# Patient Record
Sex: MALE | Race: BLACK OR AFRICAN AMERICAN
[De-identification: names, ages, dates, MRNs, and addresses within clinical notes are randomized per-mention and may not be internally consistent; named-entity substitution may affect disease eponyms.]

---

## 2021-05-09 ENCOUNTER — HOSPITAL ENCOUNTER (EMERGENCY)
Dept: HOSPITAL 61 - ER | Age: 86
Discharge: HOME | End: 2021-05-09
Payer: OTHER GOVERNMENT

## 2021-05-09 VITALS — WEIGHT: 168.43 LBS | BODY MASS INDEX: 23.58 KG/M2 | HEIGHT: 71 IN

## 2021-05-09 VITALS — SYSTOLIC BLOOD PRESSURE: 142 MMHG | DIASTOLIC BLOOD PRESSURE: 80 MMHG

## 2021-05-09 DIAGNOSIS — K52.9: Primary | ICD-10-CM

## 2021-05-09 DIAGNOSIS — I10: ICD-10-CM

## 2021-05-09 LAB
% BANDS: 3 % (ref 0–9)
% LYMPHS: 6 % (ref 24–48)
% MONOS: 4 % (ref 0–10)
% SEGS: 87 % (ref 35–66)
ALBUMIN SERPL-MCNC: 4.1 G/DL (ref 3.4–5)
ALBUMIN/GLOB SERPL: 1.1 {RATIO} (ref 1–1.7)
ALP SERPL-CCNC: 92 U/L (ref 46–116)
ALT SERPL-CCNC: 20 U/L (ref 16–63)
ANION GAP SERPL CALC-SCNC: 8 MMOL/L (ref 6–14)
AST SERPL-CCNC: 19 U/L (ref 15–37)
BASOPHILS # BLD AUTO: 0 X10^3/UL (ref 0–0.2)
BASOPHILS NFR BLD: 0 % (ref 0–3)
BILIRUB SERPL-MCNC: 0.6 MG/DL (ref 0.2–1)
BUN SERPL-MCNC: 19 MG/DL (ref 8–26)
BUN/CREAT SERPL: 17 (ref 6–20)
CALCIUM SERPL-MCNC: 8.4 MG/DL (ref 8.5–10.1)
CHLORIDE SERPL-SCNC: 107 MMOL/L (ref 98–107)
CO2 SERPL-SCNC: 28 MMOL/L (ref 21–32)
CREAT SERPL-MCNC: 1.1 MG/DL (ref 0.7–1.3)
EOSINOPHIL NFR BLD: 0 % (ref 0–3)
EOSINOPHIL NFR BLD: 0 X10^3/UL (ref 0–0.7)
ERYTHROCYTE [DISTWIDTH] IN BLOOD BY AUTOMATED COUNT: 14.1 % (ref 11.5–14.5)
GFR SERPLBLD BASED ON 1.73 SQ M-ARVRAT: 63.8 ML/MIN
GLUCOSE SERPL-MCNC: 92 MG/DL (ref 70–99)
HCT VFR BLD CALC: 48 % (ref 39–53)
HGB BLD-MCNC: 16.2 G/DL (ref 13–17.5)
LIPASE: 53 U/L (ref 73–393)
LYMPHOCYTES # BLD: 0.3 X10^3/UL (ref 1–4.8)
LYMPHOCYTES NFR BLD AUTO: 3 % (ref 24–48)
MAGNESIUM SERPL-MCNC: 2.3 MG/DL (ref 1.8–2.4)
MCH RBC QN AUTO: 32 PG (ref 25–35)
MCHC RBC AUTO-ENTMCNC: 34 G/DL (ref 31–37)
MCV RBC AUTO: 93 FL (ref 79–100)
MONO #: 0.5 X10^3/UL (ref 0–1.1)
MONOCYTES NFR BLD: 5 % (ref 0–9)
NEUT #: 9.3 X10^3/UL (ref 1.8–7.7)
NEUTROPHILS NFR BLD AUTO: 92 % (ref 31–73)
PLATELET # BLD AUTO: 230 X10^3/UL (ref 140–400)
PLATELET # BLD EST: ADEQUATE 10*3/UL
POTASSIUM SERPL-SCNC: 4.4 MMOL/L (ref 3.5–5.1)
PROT SERPL-MCNC: 8 G/DL (ref 6.4–8.2)
RBC # BLD AUTO: 5.14 X10^6/UL (ref 4.3–5.7)
SODIUM SERPL-SCNC: 143 MMOL/L (ref 136–145)
WBC # BLD AUTO: 10.1 X10^3/UL (ref 4–11)

## 2021-05-09 PROCEDURE — 83735 ASSAY OF MAGNESIUM: CPT

## 2021-05-09 PROCEDURE — 99285 EMERGENCY DEPT VISIT HI MDM: CPT

## 2021-05-09 PROCEDURE — 84484 ASSAY OF TROPONIN QUANT: CPT

## 2021-05-09 PROCEDURE — 96374 THER/PROPH/DIAG INJ IV PUSH: CPT

## 2021-05-09 PROCEDURE — 74022 RADEX COMPL AQT ABD SERIES: CPT

## 2021-05-09 PROCEDURE — 74177 CT ABD & PELVIS W/CONTRAST: CPT

## 2021-05-09 PROCEDURE — 96361 HYDRATE IV INFUSION ADD-ON: CPT

## 2021-05-09 PROCEDURE — 85007 BL SMEAR W/DIFF WBC COUNT: CPT

## 2021-05-09 PROCEDURE — 80053 COMPREHEN METABOLIC PANEL: CPT

## 2021-05-09 PROCEDURE — 85025 COMPLETE CBC W/AUTO DIFF WBC: CPT

## 2021-05-09 PROCEDURE — 93005 ELECTROCARDIOGRAM TRACING: CPT

## 2021-05-09 PROCEDURE — 83690 ASSAY OF LIPASE: CPT

## 2021-05-09 PROCEDURE — 36415 COLL VENOUS BLD VENIPUNCTURE: CPT

## 2021-05-09 NOTE — EKG
[facility]

              [address] [place] [address]

Test Date:    2021               Test Time:    15:20:42

Pat Name:     LEONA LONDON          Department:   

Patient ID:   [id number]           Room:          

Gender:       M                        Technician:   

:          1935               Requested By: BONIFACIO NEVES

Order Number: [id number]           Reading MD:     

                                 Measurements

Intervals                              Axis          

Rate:         80                       P:            55

MI:           204                      QRS:          10

QRSD:         82                       T:            42

QT:           350                                    

QTc:          407                                    

                           Interpretive Statements

SINUS RHYTHM

LEFT ATRIAL ABNORMALITY

ABNORMAL ECG

RI6.01

No previous ECG available for comparison

## 2021-05-09 NOTE — RAD
EXAM: Abdomen series.



HISTORY: Nausea and vomiting.



COMPARISON: None.



FINDINGS: A frontal view of the chest and frontal upright and supine views of the abdomen are obtaine
d. There is no infiltrate, pleural effusion or pneumothorax. The heart is normal in size. There are p
rominent air-filled loops of bowel within the midabdomen. There is no transition point to suggest obs
truction. There is no free air. There are degenerative changes throughout the spine.



IMPRESSION: 

1. No acute pulmonary finding.

2. Nonspecific air-filled loops of bowel throughout the abdomen. There is no convincing obstruction.



Electronically signed by: Mari Hightower MD (5/9/2021 4:51 PM) [facility]

## 2021-05-09 NOTE — RAD
EXAM: Abdomen and pelvis CT with intravenous contrast.



HISTORY: Nausea. Vomiting and pain.



TECHNIQUE: Computed tomographic images of the abdomen and pelvis were obtained following the administ
ration of intravenous contrast. Multiplanar reformatting was performed.



*One or more of the following individualized dose reduction techniques were utilized for this examina
tion:  

1. Automated exposure control.  

2. Adjustment of the mA and/or kV according to patient size.  

3. Use of iterative reconstruction technique.



COMPARISON: None.



FINDINGS: Evaluation of the lower thorax demonstrates bilateral posterior dependent and basilar atele
ctasis. The possibility of superimposed lower lobe interstitial infiltrate is not excluded. There is 
no pleural effusion. There is a 1.5 cm hyperdense or enhancing lesion within the lateral right hepati
c lobe, the appearance of which favors benign shunt phenomenon. There is no suspicious hepatic lesion
. The gallbladder, pancreas, spleen, stomach and adrenal glands are unremarkable. There are multiple 
simple appearing renal cysts, the largest of which is seen on the right measuring 10.0 cm. There is l
eft renal cortical scarring. There is no hydronephrosis.



The cecum is positioned within the right midabdomen. The appendix is not seen. There are prominent fl
uid-filled loops of small bowel with slight wall thickening throughout the mid and lower abdomen, sug
gesting enteritis. There is no bowel structure in. There is no free air. The urinary bladder is unrem
arkable. The aorta is normal in caliber. There is no lymphadenopathy. There are degenerative changes 
throughout the spine. There is lumbar hyperlordosis and multilevel listhesis. There is no acute or dunbar
spicious osseous finding.



IMPRESSION: 

1. Prominent fluid-filled loops of small bowel within the mid and lower lobe suggesting enteritis.. T
here is no transition point to suggest obstruction.

2. Nonvisualization of the appendix. The cecum is positioned within the right midabdomen, a normal va
riant. There are no secondary findings to suggest appendicitis.

3. Multiple simple appearing renal cysts, the largest of which is 10.0 cm on the right. There is alis
l cortical scarring.

4. Bilateral lower lobe atelectasis. The possibility of superimposed interstitial infiltrate is not e
xcluded.



Electronically signed by: Mari Hightower MD (5/9/2021 6:04 PM) [facility]

## 2021-05-09 NOTE — PHYS DOC
Past Medical History


Past Medical History:  Hypertension, Other


Additional Past Medical Histor:  R kidney cst, hernia, hemorrhoids


 (BONIFACIO NEVES DO)


Past Surgical History:  Other


Additional Past Surgical Histo:  hernia and hemorrhoids


 (BONIFACIO NEVES DO)


Smoking Status:  Never Smoker


Alcohol Use:  None


 (BONIFACIO NEVES DO)





General Adult


EDM:


Chief Complaint:  NAUSEA/VOMITING/DIARRHEA





HPI:


HPI:





Patient is a 86  year old male who was brought here by EMS due to nausea and 

vomiting with diarrhea.  Patient said he went out , ate some Chinese food 

yesterday evening, this morning he has diarrhea at home.  Patient ate some 

crackers and drink some water this morning and took his family to [employer].  

Patient then went to the nursing home to visit his sister.  While he was stand

ing there, patient started feeling queasy in his stomach, he went to his car and

sat down.  Patient started feeling very nauseous and vomited once.  He did have 

another episode of diarrhea earlier.  Patient has some cramping pain in his 

stomach area.  Patient denies any chest pain, no trouble breathing, no cough.  

Patient did not eat lunch, he was planning to go to have lunch after he visits 

his sister at the nursing home.  Patient felt much better now after he vomited. 


 (BONIFACIO NEVES DO)





Review of Systems:


Review of Systems:


Constitutional:   Denies fever or chills. []


Eyes:   Denies change in visual acuity. []


HENT:   Denies nasal congestion or sore throat. [] 


Respiratory:   Denies cough or shortness of breath. [] 


Cardiovascular:   Denies chest pain or edema. [] 


GI:   Positive for abdominal pain, nausea, vomiting, diarrhea. [] 


:  Denies dysuria. [] 


Musculoskeletal:   Denies back pain or joint pain. [] 


Integument:   Denies rash. [] 


Neurologic:   Denies headache, focal weakness or sensory changes. [] 


Endocrine:   Denies polyuria or polydipsia. [] 


Lymphatic:  Denies swollen glands. [] 


Psychiatric:  Denies depression or anxiety. []


 (BONIFACIO NEVES DO)





Heart Score:


C/O Chest Pain:  N/A


Risk Factors:


Risk Factors:  DM, Current or recent (<one month) smoker, HTN, HLP, family 

history of CAD, obesity.


Risk Scores:


Score 0 - 3:  2.5% MACE over next 6 weeks - Discharge Home


Score 4 - 6:  20.3% MACE over next 6 weeks - Admit for Clinical Observation


Score 7 - 10:  72.7% MACE over next 6 weeks - Early Invasive Strategies


 (BONIFACIO NEVES DO)





Current Medications:





Current Medications








 Medications


  (Trade)  Dose


 Ordered  Sig/Larisa  Start Time


 Stop Time Status Last Admin


Dose Admin


 


 Ondansetron HCl


  (Zofran)  4 mg  1X  ONCE  21 15:00


 21 15:01 UNV  





 


 Sodium Chloride  500 ml @ 


 500 mls/hr  1X  ONCE  21 15:00


 21 15:59 UNV  











 (BONIFACIO NEVES DO)





Physical Exam:


PE:





Constitutional: Well developed, well nourished, no acute distress, non-toxic 

appearance. []


HENT: Normocephalic, atraumatic, bilateral external ears normal, oropharynx 

moist, no oral exudates, nose normal. []


Eyes: PERRLA, EOMI, conjunctiva normal, no discharge. [] 


Neck: Normal range of motion, no tenderness, supple, no stridor. [] 


Cardiovascular:Heart rate regular rhythm, no murmur []


Lungs & Thorax:  Bilateral breath sounds clear to auscultation []


Abdomen: Bowel sounds normal, soft, no tenderness, no masses, no pulsatile 

masses. [] 


Skin: Warm, dry, no erythema, no rash. [] 


Back: No tenderness, no CVA tenderness. [] 


Extremities: No tenderness, no cyanosis, no clubbing, ROM intact, no edema. [] 


Neurologic: Alert and oriented X 3, normal motor function, normal sensory 

function, no focal deficits noted. []


Psychologic: Affect normal, judgement normal, mood normal. []


 (BONIFACIO NEVES DO)


PE:





Constitutional: Well developed, well nourished, no acute distress, non-toxic 

appearance


HENT: Normocephalic, atraumatic


Eyes: Conjunctiva normal, no discharge


Neck: Normal range of motion, supple


Lungs & Thorax:  No respiratory distress, equal chest rise and fall


Abdomen: Soft, no tenderness, no guarding/rebound tenderness/distention


Skin: Warm, dry, no erythema, no rash


Extremities: No tenderness, ROM intact, no edema


Neurologic: Alert and oriented X 3, no focal deficits noted


Psychologic: Affect normal, judgment normal


 (CASTILLO,FRIDA R DO)


Current Patient Data:


Labs:





Laboratory Tests








Test


 21


15:10


 


White Blood Count 10.1 x10^3/uL 


 


Red Blood Count 5.14 x10^6/uL 


 


Hemoglobin 16.2 g/dL 


 


Hematocrit 48.0 % 


 


Mean Corpuscular Volume 93 fL 


 


Mean Corpuscular Hemoglobin 32 pg 


 


Mean Corpuscular Hemoglobin


Concent 34 g/dL 





 


Red Cell Distribution Width 14.1 % 


 


Platelet Count 230 x10^3/uL 


 


Neutrophils (%) (Auto) 92 % 


 


Lymphocytes (%) (Auto) 3 % 


 


Monocytes (%) (Auto) 5 % 


 


Eosinophils (%) (Auto) 0 % 


 


Basophils (%) (Auto) 0 % 


 


Neutrophils # (Auto) 9.3 x10^3/uL 


 


Lymphocytes # (Auto) 0.3 x10^3/uL 


 


Monocytes # (Auto) 0.5 x10^3/uL 


 


Eosinophils # (Auto) 0.0 x10^3/uL 


 


Basophils # (Auto) 0.0 x10^3/uL 


 


Segmented Neutrophils % 87 % 


 


Band Neutrophils % 3 % 


 


Lymphocytes % 6 % 


 


Monocytes % 4 % 


 


Platelet Estimate Adequate 


 


Sodium Level 143 mmol/L 


 


Potassium Level 4.4 mmol/L 


 


Chloride Level 107 mmol/L 


 


Carbon Dioxide Level 28 mmol/L 


 


Anion Gap 8 


 


Blood Urea Nitrogen 19 mg/dL 


 


Creatinine 1.1 mg/dL 


 


Estimated GFR


(Cockcroft-Gault) 63.8 





 


BUN/Creatinine Ratio 17 


 


Glucose Level 92 mg/dL 


 


Calcium Level 8.4 mg/dL 


 


Magnesium Level 2.3 mg/dL 


 


Total Bilirubin 0.6 mg/dL 


 


Aspartate Amino Transf


(AST/SGOT) 19 U/L 





 


Alanine Aminotransferase


(ALT/SGPT) 20 U/L 





 


Alkaline Phosphatase 92 U/L 


 


Troponin I Quantitative < 0.017 ng/mL 


 


Total Protein 8.0 g/dL 


 


Albumin 4.1 g/dL 


 


Albumin/Globulin Ratio 1.1 


 


Lipase 53 U/L 








Current Medications








 Medications


  (Trade)  Dose


 Ordered  Sig/Larisa


 Route


 PRN Reason  Start Time


 Stop Time Status Last Admin


Dose Admin


 


 Ondansetron HCl


  (Zofran)  4 mg  1X  ONCE


 IVP


   21 15:00


 21 15:39 DC 21 15:37





 


 Sodium Chloride  500 ml @ 


 500 mls/hr  1X  ONCE


 IV


   21 15:00


 5/9/21 15:59 DC 21 15:39





 


 Iohexol


  (Omnipaque 300


 Mg/ml)  75 ml  1X  ONCE


 IV


   21 17:30


 21 17:31   





 


 Info


  (CONTRAST GIVEN


 -- Rx MONITORING)  1 each  PRN DAILY  PRN


 [facility]


 SEE COMMENTS  21 17:15


 21 17:14   











Vital Signs:





                                   Vital Signs








  Date Time  Temp Pulse Resp B/P (MAP) Pulse Ox O2 Delivery O2 Flow Rate FiO2


 


21 14:45 98.5 83 20 142/80 (100) 96 Room Air  





 98.5       








 (BONIFACIO NEVES DO)





EKG:


EKG:


[]


 (BONIFACIO NEVES DO)





Radiology/Procedures:


Radiology/Procedures:


[][facility]


                    [address] [place] [address]


                                 [phone]


                                        


                                 IMAGING REPORT





                                     Signed





PATIENT: LEONA LONDON  ACCOUNT: [id number]     MRN#: [id number]


: 1935           LOCATION: ER              AGE: 86


SEX: M                    EXAM DT: 21         ACCESSION#: [id number]


STATUS: REG ER            ORD. PHYSICIAN: BONIFACIO NEVES DO


REASON: nausea, vomiting


PROCEDURE: ACUTE ABDOMEN SERIES





EXAM: Abdomen series.





HISTORY: Nausea and vomiting.





COMPARISON: None.





FINDINGS: A frontal view of the chest and frontal upright and supine views of 

the abdomen are obtained. There is no infiltrate, pleural effusion or 

pneumothorax. The heart is normal in size. There are prominent air-filled loops 

of bowel within the midabdomen. There is no transition point to suggest 

obstruction. There is no free air. There are degenerative changes throughout the

 spine.





IMPRESSION: 


1. No acute pulmonary finding.


2. Nonspecific air-filled loops of bowel throughout the abdomen. There is no 

convincing obstruction.





Electronically signed by: Mari Alanis MD (2021 4:51 PM) [facility]














DICTATED and SIGNED BY:     MARI ALANIS MD


DATE:     21 [id number] 0


 (BONIFACIO NEVES DO)


Radiology/Procedures:


PROCEDURE: CT ABD PELV W/ IV CONTRST ONLY








EXAM: Abdomen and pelvis CT with intravenous contrast.





HISTORY: Nausea. Vomiting and pain.





TECHNIQUE: Computed tomographic images of the abdomen and pelvis were obtained 

following the administration of intravenous contrast. Multiplanar reformatting 

was performed.





*One or more of the following individualized dose reduction techniques were 

utilized for this examination:  


1. Automated exposure control.  


2. Adjustment of the mA and/or kV according to patient size.  


3. Use of iterative reconstruction technique.





COMPARISON: None.





FINDINGS: Evaluation of the lower thorax demonstrates bilateral posterior 

dependent and basilar atelectasis. The possibility of superimposed lower lobe 

interstitial infiltrate is not excluded. There is no pleural effusion. There is 

a 1.5 cm hyperdense or enhancing lesion within the lateral right hepatic lobe, 

the appearance of which favors benign shunt phenomenon. There is no suspicious 

hepatic lesion. The gallbladder, pancreas, spleen, stomach and adrenal glands 

are unremarkable. There are multiple simple appearing renal cysts, the largest 

of which is seen on the right measuring 10.0 cm. There is left renal cortical 

scarring. There is no hydronephrosis.





The cecum is positioned within the right midabdomen. The appendix is not seen. 

There are prominent fluid-filled loops of small bowel with slight wall 

thickening throughout the mid and lower abdomen, suggesting enteritis. There is 

no bowel structure in. There is no free air. The urinary bladder is 

unremarkable. The aorta is normal in caliber. There is no lymphadenopathy. There

 are degenerative changes throughout the spine. There is lumbar hyperlordosis 

and multilevel listhesis. There is no acute or suspicious osseous finding.





IMPRESSION: 


1. Prominent fluid-filled loops of small bowel within the mid and lower lobe 

suggesting enteritis.. There is no transition point to suggest obstruction.


2. Nonvisualization of the appendix. The cecum is positioned within the right 

midabdomen, a normal variant. There are no secondary findings to suggest 

appendicitis.


3. Multiple simple appearing renal cysts, the largest of which is 10.0 cm on the

 right. There is renal cortical scarring.


4. Bilateral lower lobe atelectasis. The possibility of superimposed 

interstitial infiltrate is not excluded.





Electronically signed by: Mari Alanis MD (2021 6:04 PM) [facility]


 (FRIDA CASTILLO DO)


Course & Med Decision Making:


Course & Med Decision Making


Pertinent Labs and Imaging studies reviewed. (See chart for details)





Patient is a 86-year-old male who presented to ER due to nausea vomiting and 

diarrhea.  Patient is suspected to have food poisoning.  Patient was givne IV 

fluid and nausea medication in the ED, he feels much better.  Care was 

transferred over to Dr. Castillo at shift change


 (BONIFACIO NEVES DO)


Course & Med Decision Making


1800-signout received from Dr. Neves for patient with nausea/vomiting/diarrhea.  

Labs reviewed.  X-ray reviewed.  Patient pending CT results.





CT results with signs of enteritis.





Patient previously treated symptomatically.





Patient seen and evaluated by myself.  Patient reports interval improvement.  

Abdomen nonperitoneal.





Patient stable for discharge with outpatient follow-up with PCP. Discussed 

findings and plan with patient, who acknowledges understanding and agreement.


 (FRIDA CASTILLO DO)


Dragon Disclaimer:


Dragon Disclaimer:


This electronic medical record was generated, in whole or in part, using a voice

 recognition dictation system.


 (BONIFACIO NEVES DO)





Departure


Departure


Impression:  


   Primary Impression:  


   Gastroenteritis


Disposition:   HOME / SELF CARE / HOMELESS


Condition:  IMPROVED


Patient Instructions:  Clear Liquid Diet, Easy-to-Read, Diet for Diarrhea, 

Adult, Viral Gastroenteritis, Easy-to-Read


Scripts


Hyoscyamine Sulfate (LEVSIN-SL) 0.125 Mg Tab.subl


0.125 MG SL Q4-6HRS PRN for PAIN, #14 TAB


   Prov: FRIDA CASTILLO DO         21 


Ondansetron (ONDANSETRON ODT) 4 Mg Tab.rapdis


1 TAB PO PRN Q6-8HRS PRN for NAUSEA, #16 TAB


   Prov: FRIDA CASTILLO DO         21 


Famotidine (PEPCID) 20 Mg Tablet


20 MG PO BID for 5 Days, #10 TAB


   Prov: FRIDA CASTILLO DO         21











BONIFACIO NEVES DO                 May 9, 2021 15:16


FRIDA CASTILLO DO              May 9, 2021 18:25